# Patient Record
Sex: FEMALE | URBAN - METROPOLITAN AREA
[De-identification: names, ages, dates, MRNs, and addresses within clinical notes are randomized per-mention and may not be internally consistent; named-entity substitution may affect disease eponyms.]

---

## 2020-08-31 ENCOUNTER — APPOINTMENT (RX ONLY)
Dept: URBAN - METROPOLITAN AREA CLINIC 281 | Facility: CLINIC | Age: 55
Setting detail: DERMATOLOGY
End: 2020-08-31

## 2020-08-31 DIAGNOSIS — L30.0 NUMMULAR DERMATITIS: ICD-10-CM

## 2020-08-31 PROBLEM — L30.9 DERMATITIS, UNSPECIFIED: Status: ACTIVE | Noted: 2020-08-31

## 2020-08-31 PROCEDURE — 99202 OFFICE O/P NEW SF 15 MIN: CPT

## 2020-08-31 PROCEDURE — ? PRESCRIPTION

## 2020-08-31 PROCEDURE — ? COUNSELING

## 2020-08-31 RX ORDER — CLOBETASOL PROPIONATE 0.5 MG/G
OINTMENT TOPICAL BID
Qty: 1 | Refills: 1 | Status: ERX | COMMUNITY
Start: 2020-08-31

## 2020-08-31 RX ADMIN — CLOBETASOL PROPIONATE: 0.5 OINTMENT TOPICAL at 00:00

## 2020-08-31 ASSESSMENT — LOCATION SIMPLE DESCRIPTION DERM: LOCATION SIMPLE: LEFT PRETIBIAL REGION

## 2020-08-31 ASSESSMENT — LOCATION DETAILED DESCRIPTION DERM: LOCATION DETAILED: LEFT LATERAL PROXIMAL PRETIBIAL REGION

## 2020-08-31 ASSESSMENT — LOCATION ZONE DERM: LOCATION ZONE: LEG

## 2020-09-21 ENCOUNTER — APPOINTMENT (RX ONLY)
Dept: URBAN - METROPOLITAN AREA CLINIC 281 | Facility: CLINIC | Age: 55
Setting detail: DERMATOLOGY
End: 2020-09-21

## 2020-09-21 DIAGNOSIS — L30.0 NUMMULAR DERMATITIS: ICD-10-CM | Status: IMPROVED

## 2020-09-21 DIAGNOSIS — D22 MELANOCYTIC NEVI: ICD-10-CM

## 2020-09-21 DIAGNOSIS — B35.3 TINEA PEDIS: ICD-10-CM

## 2020-09-21 DIAGNOSIS — L81.4 OTHER MELANIN HYPERPIGMENTATION: ICD-10-CM

## 2020-09-21 PROBLEM — D22.9 MELANOCYTIC NEVI, UNSPECIFIED: Status: ACTIVE | Noted: 2020-09-21

## 2020-09-21 PROBLEM — L30.9 DERMATITIS, UNSPECIFIED: Status: ACTIVE | Noted: 2020-09-21

## 2020-09-21 PROCEDURE — ? PRESCRIPTION

## 2020-09-21 PROCEDURE — ? COUNSELING

## 2020-09-21 PROCEDURE — 99214 OFFICE O/P EST MOD 30 MIN: CPT

## 2020-09-21 PROCEDURE — ? FULL BODY SKIN EXAM

## 2020-09-21 PROCEDURE — ? PRESCRIPTION MEDICATION MANAGEMENT

## 2020-09-21 RX ORDER — KETOCONAZOLE 20 MG/G
CREAM TOPICAL BID
Qty: 2 | Refills: 3 | Status: ERX | COMMUNITY
Start: 2020-09-21

## 2020-09-21 RX ADMIN — KETOCONAZOLE: 20 CREAM TOPICAL at 00:00

## 2020-09-21 ASSESSMENT — LOCATION SIMPLE DESCRIPTION DERM
LOCATION SIMPLE: LEFT PRETIBIAL REGION
LOCATION SIMPLE: LEFT ANKLE

## 2020-09-21 ASSESSMENT — LOCATION DETAILED DESCRIPTION DERM
LOCATION DETAILED: LEFT ANKLE
LOCATION DETAILED: LEFT LATERAL DISTAL PRETIBIAL REGION

## 2020-09-21 ASSESSMENT — LOCATION ZONE DERM: LOCATION ZONE: LEG

## 2020-09-21 NOTE — PROCEDURE: PRESCRIPTION MEDICATION MANAGEMENT
Modify Regimen: Clobetasol ointment once daily Mon-Fri; \\nKetoconazole 2% cream daily (rx written for the feet)
Render In Strict Bullet Format?: No
Detail Level: Simple

## 2021-11-02 ENCOUNTER — APPOINTMENT (RX ONLY)
Dept: URBAN - METROPOLITAN AREA CLINIC 281 | Facility: CLINIC | Age: 56
Setting detail: DERMATOLOGY
End: 2021-11-02

## 2021-11-02 DIAGNOSIS — L91.8 OTHER HYPERTROPHIC DISORDERS OF THE SKIN: ICD-10-CM

## 2021-11-02 DIAGNOSIS — L26 EXFOLIATIVE DERMATITIS: ICD-10-CM

## 2021-11-02 DIAGNOSIS — B35.3 TINEA PEDIS: ICD-10-CM | Status: WELL CONTROLLED

## 2021-11-02 PROCEDURE — 11200 RMVL SKIN TAGS UP TO&INC 15: CPT

## 2021-11-02 PROCEDURE — ? SKIN TAG REMOVAL

## 2021-11-02 PROCEDURE — 99213 OFFICE O/P EST LOW 20 MIN: CPT | Mod: 25

## 2021-11-02 PROCEDURE — ? PRESCRIPTION

## 2021-11-02 PROCEDURE — ? COUNSELING

## 2021-11-02 PROCEDURE — ? TREATMENT REGIMEN

## 2021-11-02 RX ORDER — KETOCONAZOLE 20 MG/G
CREAM TOPICAL BID
Qty: 60 | Refills: 2 | Status: ERX

## 2021-11-02 ASSESSMENT — LOCATION DETAILED DESCRIPTION DERM
LOCATION DETAILED: LEFT LATERAL PLANTAR FOOT
LOCATION DETAILED: RIGHT MEDIAL DORSAL FOOT
LOCATION DETAILED: LEFT MEDIAL DORSAL FOOT
LOCATION DETAILED: LEFT AXILLARY VAULT
LOCATION DETAILED: RIGHT AXILLARY VAULT
LOCATION DETAILED: RIGHT LATERAL PLANTAR FOOT

## 2021-11-02 ASSESSMENT — LOCATION SIMPLE DESCRIPTION DERM
LOCATION SIMPLE: RIGHT AXILLARY VAULT
LOCATION SIMPLE: LEFT AXILLARY VAULT
LOCATION SIMPLE: LEFT FOOT
LOCATION SIMPLE: RIGHT FOOT

## 2021-11-02 ASSESSMENT — LOCATION ZONE DERM
LOCATION ZONE: AXILLAE
LOCATION ZONE: FEET

## 2021-11-02 NOTE — PROCEDURE: SKIN TAG REMOVAL
Medical Necessity Information: It is in your best interest to select a reason for this procedure from the list below. All of these items fulfill various CMS LCD requirements except the new and changing color options.
Consent: Verbal consent obtained and the risks of skin tag removal was reviewed with the patient including but not limited to bleeding, pigmentary change, infection, pain, and remote possibility of scarring.
Medical Necessity Clause: This procedure was medically necessary because the lesions that were treated were:
Anesthesia Volume In Cc: 1
Detail Level: Detailed
Hemostasis: Aluminum Chloride
Include Z78.9 (Other Specified Conditions Influencing Health Status) As An Associated Diagnosis?: No
Add Associated Diagnoses If Applicable When Selecting Medical Necessity: Yes

## 2023-12-13 ENCOUNTER — APPOINTMENT (RX ONLY)
Dept: URBAN - METROPOLITAN AREA CLINIC 281 | Facility: CLINIC | Age: 58
Setting detail: DERMATOLOGY
End: 2023-12-13

## 2023-12-13 DIAGNOSIS — D22 MELANOCYTIC NEVI: ICD-10-CM

## 2023-12-13 DIAGNOSIS — D36.1 BENIGN NEOPLASM OF PERIPHERAL NERVES AND AUTONOMIC NERVOUS SYSTEM: ICD-10-CM

## 2023-12-13 DIAGNOSIS — D18.0 HEMANGIOMA: ICD-10-CM

## 2023-12-13 DIAGNOSIS — B35.3 TINEA PEDIS: ICD-10-CM

## 2023-12-13 DIAGNOSIS — L82.1 OTHER SEBORRHEIC KERATOSIS: ICD-10-CM

## 2023-12-13 PROBLEM — D22.9 MELANOCYTIC NEVI, UNSPECIFIED: Status: ACTIVE | Noted: 2023-12-13

## 2023-12-13 PROBLEM — D18.01 HEMANGIOMA OF SKIN AND SUBCUTANEOUS TISSUE: Status: ACTIVE | Noted: 2023-12-13

## 2023-12-13 PROBLEM — D36.14 BENIGN NEOPLASM OF PERIPHERAL NERVES AND AUTONOMIC NERVOUS SYSTEM OF THORAX: Status: ACTIVE | Noted: 2023-12-13

## 2023-12-13 PROBLEM — L08.9 LOCAL INFECTION OF THE SKIN AND SUBCUTANEOUS TISSUE, UNSPECIFIED: Status: ACTIVE | Noted: 2023-12-13

## 2023-12-13 PROCEDURE — ? FULL BODY SKIN EXAM

## 2023-12-13 PROCEDURE — ? PRESCRIPTION

## 2023-12-13 PROCEDURE — 99214 OFFICE O/P EST MOD 30 MIN: CPT

## 2023-12-13 PROCEDURE — ? COUNSELING

## 2023-12-13 RX ORDER — KETOCONAZOLE 20 MG/G
CREAM TOPICAL BID
Qty: 60 | Refills: 1 | Status: ERX | COMMUNITY
Start: 2023-12-13

## 2023-12-13 RX ADMIN — KETOCONAZOLE: 20 CREAM TOPICAL at 00:00

## 2023-12-13 ASSESSMENT — LOCATION ZONE DERM
LOCATION ZONE: TOE
LOCATION ZONE: FEET
LOCATION ZONE: TRUNK

## 2023-12-13 ASSESSMENT — LOCATION DETAILED DESCRIPTION DERM
LOCATION DETAILED: LEFT DORSAL 2ND TOE
LOCATION DETAILED: RIGHT DORSAL 2ND TOE
LOCATION DETAILED: RIGHT MEDIAL SUPERIOR CHEST
LOCATION DETAILED: LEFT MEDIAL PLANTAR MIDFOOT
LOCATION DETAILED: RIGHT MEDIAL PLANTAR MIDFOOT

## 2023-12-13 ASSESSMENT — LOCATION SIMPLE DESCRIPTION DERM
LOCATION SIMPLE: RIGHT 2ND TOE
LOCATION SIMPLE: LEFT 2ND TOE
LOCATION SIMPLE: CHEST
LOCATION SIMPLE: LEFT PLANTAR SURFACE
LOCATION SIMPLE: RIGHT PLANTAR SURFACE

## 2023-12-13 ASSESSMENT — SEVERITY ASSESSMENT: SEVERITY: SEVERE

## 2024-01-31 ENCOUNTER — APPOINTMENT (RX ONLY)
Dept: URBAN - METROPOLITAN AREA CLINIC 281 | Facility: CLINIC | Age: 59
Setting detail: DERMATOLOGY
End: 2024-01-31

## 2024-01-31 DIAGNOSIS — B35.3 TINEA PEDIS: ICD-10-CM | Status: IMPROVED

## 2024-01-31 PROCEDURE — ? COUNSELING

## 2024-01-31 PROCEDURE — ? PRESCRIPTION MEDICATION MANAGEMENT

## 2024-01-31 PROCEDURE — 99213 OFFICE O/P EST LOW 20 MIN: CPT

## 2024-01-31 ASSESSMENT — SEVERITY ASSESSMENT: SEVERITY: ALMOST CLEAR

## 2024-01-31 ASSESSMENT — LOCATION DETAILED DESCRIPTION DERM
LOCATION DETAILED: RIGHT PLANTAR FOREFOOT OVERLYING 3RD METATARSAL
LOCATION DETAILED: LEFT PLANTAR FOREFOOT OVERLYING 3RD METATARSAL

## 2024-01-31 ASSESSMENT — LOCATION SIMPLE DESCRIPTION DERM
LOCATION SIMPLE: RIGHT PLANTAR SURFACE
LOCATION SIMPLE: LEFT PLANTAR SURFACE

## 2024-01-31 ASSESSMENT — LOCATION ZONE DERM: LOCATION ZONE: FEET

## 2024-01-31 NOTE — PROCEDURE: PRESCRIPTION MEDICATION MANAGEMENT
Continue Regimen: Ketoconazole cream as needed
Render In Strict Bullet Format?: No
Detail Level: Zone

## 2025-05-28 ENCOUNTER — APPOINTMENT (OUTPATIENT)
Dept: URBAN - METROPOLITAN AREA CLINIC 281 | Facility: CLINIC | Age: 60
Setting detail: DERMATOLOGY
End: 2025-05-28

## 2025-05-28 DIAGNOSIS — D22 MELANOCYTIC NEVI: ICD-10-CM

## 2025-05-28 DIAGNOSIS — L57.0 ACTINIC KERATOSIS: ICD-10-CM

## 2025-05-28 DIAGNOSIS — L82.1 OTHER SEBORRHEIC KERATOSIS: ICD-10-CM

## 2025-05-28 DIAGNOSIS — D18.0 HEMANGIOMA: ICD-10-CM

## 2025-05-28 DIAGNOSIS — L81.4 OTHER MELANIN HYPERPIGMENTATION: ICD-10-CM

## 2025-05-28 PROBLEM — D22.9 MELANOCYTIC NEVI, UNSPECIFIED: Status: ACTIVE | Noted: 2025-05-28

## 2025-05-28 PROBLEM — D18.01 HEMANGIOMA OF SKIN AND SUBCUTANEOUS TISSUE: Status: ACTIVE | Noted: 2025-05-28

## 2025-05-28 PROCEDURE — ? LIQUID NITROGEN

## 2025-05-28 PROCEDURE — 17003 DESTRUCT PREMALG LES 2-14: CPT

## 2025-05-28 PROCEDURE — ? COUNSELING

## 2025-05-28 PROCEDURE — ? FULL BODY SKIN EXAM

## 2025-05-28 PROCEDURE — 99213 OFFICE O/P EST LOW 20 MIN: CPT | Mod: 25

## 2025-05-28 PROCEDURE — 17000 DESTRUCT PREMALG LESION: CPT

## 2025-05-28 ASSESSMENT — LOCATION DETAILED DESCRIPTION DERM
LOCATION DETAILED: LEFT FOREHEAD
LOCATION DETAILED: LEFT NASAL ALA

## 2025-05-28 ASSESSMENT — LOCATION ZONE DERM
LOCATION ZONE: FACE
LOCATION ZONE: NOSE

## 2025-05-28 ASSESSMENT — LOCATION SIMPLE DESCRIPTION DERM
LOCATION SIMPLE: LEFT NOSE
LOCATION SIMPLE: LEFT FOREHEAD

## 2025-05-28 NOTE — PROCEDURE: LIQUID NITROGEN
Number Of Freeze-Thaw Cycles: 3 freeze-thaw cycles
Show Applicator Variable?: Yes
Detail Level: Detailed
Render Note In Bullet Format When Appropriate: No
Post-Care Instructions: I reviewed with the patient in detail post-care instructions. Patient is to wear sunprotection, and avoid picking at any of the treated lesions. Pt may apply Vaseline to crusted or scabbing areas.
Duration Of Freeze Thaw-Cycle (Seconds): 0
Consent: The patient's consent was obtained including but not limited to risks of crusting, scabbing, blistering, scarring, darker or lighter pigmentary change, recurrence, incomplete removal and infection.

## 2025-05-28 NOTE — HPI: EVALUATION OF SKIN LESION(S)
Hpi Title: Evaluation of a Skin Lesion
Additional History: Spot on left side of nose that has been present for about a year to two years that goes away and comes back pt would like it removed.